# Patient Record
Sex: FEMALE | Race: AMERICAN INDIAN OR ALASKA NATIVE
[De-identification: names, ages, dates, MRNs, and addresses within clinical notes are randomized per-mention and may not be internally consistent; named-entity substitution may affect disease eponyms.]

---

## 2018-05-09 ENCOUNTER — HOSPITAL ENCOUNTER (EMERGENCY)
Dept: HOSPITAL 5 - ED | Age: 53
Discharge: HOME | End: 2018-05-09
Payer: MEDICARE

## 2018-05-09 VITALS — SYSTOLIC BLOOD PRESSURE: 133 MMHG | DIASTOLIC BLOOD PRESSURE: 50 MMHG

## 2018-05-09 DIAGNOSIS — I10: ICD-10-CM

## 2018-05-09 DIAGNOSIS — Z87.891: ICD-10-CM

## 2018-05-09 DIAGNOSIS — L03.115: Primary | ICD-10-CM

## 2018-05-09 DIAGNOSIS — Z88.2: ICD-10-CM

## 2018-05-09 DIAGNOSIS — M32.9: ICD-10-CM

## 2018-05-09 PROCEDURE — 99282 EMERGENCY DEPT VISIT SF MDM: CPT

## 2018-05-09 PROCEDURE — 96372 THER/PROPH/DIAG INJ SC/IM: CPT

## 2018-05-09 NOTE — EMERGENCY DEPARTMENT REPORT
ED Extremity Problem HPI





- General


Chief complaint: Extremity Injury, Lower


Stated complaint: INSECT/ANIMAL BITE


Time Seen by Provider: 05/09/18 12:42


Source: patient


Mode of arrival: Ambulatory


Limitations: No Limitations





- History of Present Illness


Initial comments: 





52-year-old -American female with a past medical history of lupus comes 

in for complaint of right foot with possible insect bite 2 days.  Patient 

reports that her right foot skin has been red and and swollen.  Patient denies 

any drainage or bite marks.  She is walking with crutches.  She reports that 

the pain is constant sharp located on the right foot near the first metacarpal.

  She reports past medical history of hypertension currently on atenolol and 

triamterene.  Lupus she's taken Plaquenil and prednisone.


MD Complaint: extremity pain, extremity swelling


-: days(s) (2)


Location: right, other


History of Same: No (foot)


Severity scale (0 -10): 10


Quality: aching, sharp, constant


Improves with: rest


Worsens with: weight bearing


Associated Symptoms: denies other symptoms





- Related Data


 Previous Rx's











 Medication  Instructions  Recorded  Last Taken  Type


 


Cephalexin [Keflex] 500 mg PO BID #20 capsule 05/09/18 Unknown Rx


 


Ibuprofen [Motrin 800 MG tab] 800 mg PO Q8HR PRN #30 tablet 05/09/18 Unknown Rx











 Allergies











Allergy/AdvReac Type Severity Reaction Status Date / Time


 


Sulfa (Sulfonamide AdvReac  Rash Unverified 03/12/14 11:13





Antibiotics)     














ED Review of Systems


ROS: 


Stated complaint: INSECT/ANIMAL BITE


Other details as noted in HPI





Comment: All other systems reviewed and negative


Musculoskeletal: joint swelling (right foot), arthralgia (right foot)


Skin: change in color (right foot)


Psychiatric: denies: anxiety, depression


Hematological/Lymphatic: denies: easy bleeding, easy bruising





ED Past Medical Hx





- Past Medical History


Previous Medical History?: Yes


Additional medical history: Lupus, Left wrist pain





- Surgical History


Past Surgical History?: Yes


Additional Surgical History: right hip surgery x 2 with Right hip replacement





- Social History


Smoking Status: Former Smoker


Substance Use Type: Prescribed





- Medications


Home Medications: 


 Home Medications











 Medication  Instructions  Recorded  Confirmed  Last Taken  Type


 


Cephalexin [Keflex] 500 mg PO BID #20 capsule 05/09/18  Unknown Rx


 


Ibuprofen [Motrin 800 MG tab] 800 mg PO Q8HR PRN #30 tablet 05/09/18  Unknown Rx














ED Physical Exam





- General


Limitations: No Limitations


General appearance: alert, in no apparent distress





- Head


Head exam: Present: atraumatic, normocephalic





- Expanded Lower Extremity Exam


  ** Right


Hip exam: Present: normal inspection, full ROM


Upper Leg exam: Present: normal inspection, full ROM


Knee exam: Present: normal inspection, full ROM


Lower Leg exam: Present: normal inspection, full ROM


Ankle exam: Present: normal inspection, full ROM


Foot/Toe exam: Present: full ROM, tenderness (first medical carpal), swelling (

first metacarpal), erythema.  Absent: ecchymosis, deformity, crepidus, puncture 

wound, foreign body


Neuro vascular tendon exam: Present: no vascular compromise


Gait: Positive: observed and limited by pain (using crutches)





ED Course





 Vital Signs











  05/09/18





  10:06


 


Temperature 98.3 F


 


Pulse Rate 74


 


Respiratory 20





Rate 


 


Blood Pressure 133/50


 


O2 Sat by Pulse 99





Oximetry 














ED Medical Decision Making





- Medical Decision Making





Patient's been evaluated by this provider fast track.  I discussed the patient 

we will not know if she's been bitten by a snake or any insect.  Discussed the 

patient that her foot appears to have a cellulitis concern that is warm red and 

swollen.  Discussed the patient we'll place her on Keflex for 10 days and 

ibuprofen.  Patient has a sulfur allergy.  Discussed the patient to follow up 

with her primary care provider if symptoms persist or gets worse.  Patient 

verbalized understanding


Critical care attestation.: 


If time is entered above; I have spent that time in minutes in the direct care 

of this critically ill patient, excluding procedure time.








ED Disposition


Clinical Impression: 


 Cellulitis of right foot without toes





Disposition: DC-01 TO HOME OR SELFCARE


Is pt being admited?: No


Does the pt Need Aspirin: No


Condition: Stable


Instructions:  Cellulitis (ED)


Additional Instructions: 


Please complete antibiotics as prescribed.  Ibuprofen as needed for pain and 

swelling.  If symptoms persist or gets worse please follow up with her primary 

care provider.


Prescriptions: 


Cephalexin [Keflex] 500 mg PO BID #20 capsule


Ibuprofen [Motrin 800 MG tab] 800 mg PO Q8HR PRN #30 tablet


 PRN Reason: Pain


Referrals: 


KEILA KASPER MD [Staff Physician] - 3-5 Days

## 2019-09-12 ENCOUNTER — HOSPITAL ENCOUNTER (OUTPATIENT)
Dept: HOSPITAL 5 - MAMMO | Age: 54
Discharge: HOME | End: 2019-09-12
Attending: PEDIATRICS
Payer: MEDICARE

## 2019-09-12 DIAGNOSIS — Z78.0: ICD-10-CM

## 2019-09-12 DIAGNOSIS — M85.88: Primary | ICD-10-CM

## 2019-09-12 PROCEDURE — 77080 DXA BONE DENSITY AXIAL: CPT

## 2019-09-13 NOTE — MAMMOGRAPHY REPORT
BONE DEXA



CLINICAL: Postmenopausal.



COMPARISON: 7/26/2013



TECHNIQUE: 2 site bone DEXA performed on an Hologic scanner.



FINDINGS:



The average BMD of the lumbar spine L1-L4 is 0.936g/cm squared with a T score of -1.0 and a Z score o
f -0.7. This compares to 1.038g/cm squared on the last exam and represents a -9.8 % change from the [
previous baseline].



The average BMD of the left hip is 0.775 g/cm squared with a T score of -1.4and a Z score of -1.1. Th
is compares to 0.794 g/cm squared on the last exam and represents a -2.3 % change from the [previous 
baseline].



IMPRESSION:

1. WHO classification: Normal with average fracture risk based on spine measurements. A moderate decl
ine in spine BMD compared to the last exam.

2. WHO classification Osteopenia with increased fracture risk based on left hip measurements. A modes
t decline in left hip BMD compared to the last exam.

3. The FRAX 10 year fracture probability for a major osteoporotic fracture is 2.9%.

4. The FRAX 10 year fracture probability for hip fracture is 0.2%.



Note:FRAX version 3.01. Fracture probability calculated for an untreated patient. Fracture probabilit
y may be lower if the patient has received treatment.





RECOMMENDATION: Clinical correlation and routine screening.



Definitions:

BMD equal bone mineral density

T score = BMD related to peak bone mass of young adult (Anastasiia expressed an standard deviation)

Z score = age-matched BMD expressed in SD

World health organization (WHO) diagnostic criteria

Normal T score greater than equal to 1 standard deviation

Osteopenia T score between -1 and -2.4 standard deviation

Osteoporosis T score -2.5 standard deviation or below.



Note: BMD is not the only risk factor for fracture; also consider factors such as the patient's age, 
risk of falling, previous osteoporotic fracture, family history of osteoporotic fractures, current sm
oker and low body weight.



Z scores are not calculated if greater than 80 years of age.



Signer Name: Joaquim Kramer MD 

Signed: 9/13/2019 8:27 AM

 Workstation Name: [id number]

## 2021-03-31 ENCOUNTER — HOSPITAL ENCOUNTER (EMERGENCY)
Dept: HOSPITAL 5 - ED | Age: 56
Discharge: HOME | End: 2021-03-31
Payer: MEDICARE

## 2021-03-31 VITALS — SYSTOLIC BLOOD PRESSURE: 112 MMHG | DIASTOLIC BLOOD PRESSURE: 74 MMHG
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
## 2021-03-31 DIAGNOSIS — S90.32XA: ICD-10-CM

## 2021-03-31 DIAGNOSIS — S90.861A: Primary | ICD-10-CM

## 2021-03-31 DIAGNOSIS — Z88.2: ICD-10-CM

## 2021-03-31 DIAGNOSIS — Z98.890: ICD-10-CM

## 2021-03-31 DIAGNOSIS — Z87.891: ICD-10-CM
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
## 2021-03-31 DIAGNOSIS — L03.115: ICD-10-CM
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
## 2021-03-31 DIAGNOSIS — Z79.1: ICD-10-CM

## 2021-03-31 DIAGNOSIS — Z79.2: ICD-10-CM

## 2021-03-31 PROCEDURE — 99283 EMERGENCY DEPT VISIT LOW MDM: CPT

## 2021-03-31 PROCEDURE — 96372 THER/PROPH/DIAG INJ SC/IM: CPT

## 2021-03-31 PROCEDURE — 73630 X-RAY EXAM OF FOOT: CPT

## 2021-03-31 NOTE — EVENT NOTE
ED Screening Note


Date of service: 03/31/21


Time: 13:31


ED Screening Note: 





Pt complains of left foot crush injury with cans 6 days ago  and right foot pain

x3 to 4 weeks after a wall sustaining


History of lupus


Denies being on immunosuppressive medications





This initial assessment/diagnostic orders/clinical plan/treatment(s) is/are 

subject to change based on patients health status, clinical progression and re-

assessment by fellow clinical providers in the ED. Further treatment and workup 

at subsequent clinical providers discretion. Patient/guardian urged not to elope

from the ED as their condition may be serious if not clinically assessed and 

managed. 





Initial orders include: 


X-ray

## 2021-03-31 NOTE — EMERGENCY DEPARTMENT REPORT
ED Lower Extremity HPI





- General


Chief Complaint: Extremity Injury, Lower


Stated Complaint: FOOT PAIN


Time Seen by Provider: 03/31/21 12:56


Source: patient


Mode of arrival: Ambulatory


Limitations: No Limitations





- History of Present Illness


Initial Comments: 





56 YO AA COMES TO ER WITH B FOOT PAIN





RIGHT FOOT- WHILE DRIVING IN EARLY MARCH, SHE STATES SHE GOT BIT BY AN INSECT. 





LEFT FOOT- WHILE PUTTING GROCERIES AWAY SHE DROPPED 2 LARGE CANS OF PEACHES ON 

THEM





PT TOOK NOTHING FOR PAIN PTA





PT DID NOT SEE HER PCP





HX FX L FOOT PER PT











NEUROVASC INTACT ON ARRIVAL TO ACC; XRAY NEG AS ORDERED IN TRIAGE





- Related Data


                                  Previous Rx's











 Medication  Instructions  Recorded  Last Taken  Type


 


Amoxicillin [Trimox CAP] 500 mg PO BID #20 capsule 03/31/21 Unknown Rx


 


Ibuprofen [Motrin] 800 mg PO Q8HR PRN #30 tablet 03/31/21 Unknown Rx











                                    Allergies











Allergy/AdvReac Type Severity Reaction Status Date / Time


 


Sulfa (Sulfonamide AdvReac  Rash Unverified 03/12/14 11:13





Antibiotics)     














ED Review of Systems


ROS: 


Stated complaint: FOOT PAIN


Other details as noted in HPI





Comment: All other systems reviewed and negative





ED Past Medical Hx





- Past Medical History


Previous Medical History?: Yes


Additional medical history: Lupus, Left wrist pain





- Surgical History


Past Surgical History?: Yes


Additional Surgical History: right hip surgery x 2 with Right hip replacement





- Family History


Family history: no significant





- Social History


Smoking Status: Former Smoker


Substance Use Type: Prescribed





- Medications


Home Medications: 


                                Home Medications











 Medication  Instructions  Recorded  Confirmed  Last Taken  Type


 


Amoxicillin [Trimox CAP] 500 mg PO BID #20 capsule 03/31/21  Unknown Rx


 


Ibuprofen [Motrin] 800 mg PO Q8HR PRN #30 tablet 03/31/21  Unknown Rx














ED Physical Exam





- General


Limitations: No Limitations


General appearance: alert, in no apparent distress





- Head


Head exam: Present: atraumatic, normocephalic





- Eye


Eye exam: Present: normal appearance





- ENT


ENT exam: Present: mucous membranes moist





- Neck


Neck exam: Present: normal inspection





- Respiratory


Respiratory exam: Present: normal lung sounds bilaterally.  Absent: respiratory 

distress





- Cardiovascular


Cardiovascular Exam: Present: regular rate, normal rhythm.  Absent: systolic 

murmur, diastolic murmur, rubs, gallop





- GI/Abdominal


GI/Abdominal exam: Present: soft, normal bowel sounds





- Extremities Exam


Extremities exam: Present: normal inspection





- Back Exam


Back exam: Present: normal inspection





- Neurological Exam


Neurological exam: Present: alert, oriented X3





- Psychiatric


Psychiatric exam: Present: normal affect, normal mood





- Skin


Skin exam: Present: warm, dry, intact, other (swelling r foot - mid foot area sp

 bug bite early march; lle wnl).  Absent: rash





ED Course


                                   Vital Signs











  03/31/21





  11:48


 


Temperature 99.5 F


 


Pulse Rate 74


 


Respiratory 16





Rate 


 


Blood Pressure 112/74





[Right] 


 


O2 Sat by Pulse 98





Oximetry 














ED Lower Extremity MDM





- Radiology Data


Radiology results: report reviewed, image reviewed





nap





- Medical Decision Making





xr neg





soft tissue swelling w redness r foot


neurovasc intact


will treat for cellulitis





contusion l foot





crutches for comfort








dc home with dc plan of care and pcp follow up; pt verbalizes understanding. 








                                   Vital Signs











  03/31/21





  11:48


 


Temperature 99.5 F


 


Pulse Rate 74


 


Respiratory 16





Rate 


 


Blood Pressure 112/74





[Right] 


 


O2 Sat by Pulse 98





Oximetry 














- Differential Diagnosis


ro fx


Critical care attestation.: 


If time is entered above; I have spent that time in minutes in the direct care 

of this critically ill patient, excluding procedure time.








ED Disposition


Clinical Impression: 


 Insect bite, Cellulitis, Contusion





Disposition: DC-01 TO HOME OR SELFCARE


Is pt being admited?: No


Does the pt Need Aspirin: No


Condition: Stable


Instructions:  Cellulitis, Adult, Foot Contusion, Easy-to-Read


Additional Instructions: 


ICE 


REST


ELEVATE FEET





MEDS AS ORDERED TODAY





TYLENOL OVER THE COUNTER CAN BE USED FOR PAIN 





CRUTCHES ARE TEMPORARY AND ONLY FOR COMFORT


USE FOR 48 HOURS AND THEN SEE PCP





REFERRAL BELOW FOR PCP


Prescriptions: 


Ibuprofen [Motrin] 800 mg PO Q8HR PRN #30 tablet


 PRN Reason: Pain, Moderate (4-6)


Amoxicillin [Trimox CAP] 500 mg PO BID #20 capsule


Referrals: 


VAUGHN MARIE MD [Staff Physician] - 3-5 Days


Time of Disposition: 14:13

## 2021-03-31 NOTE — XRAY REPORT
Right foot 3 views



INDICATION: Crush injury



FINDINGS: MTP joints and IP joints appear normal. Calcaneal spurring is seen. No displaced fracture. 
Mild swelling of the distal foot.



Left foot 3 views



INDICATION: Injury



FINDINGS: MTP joints and IP joints appear normal. Calcaneal spurring is seen. Midfoot alignment appea
rs normal. Mild swelling in the distal foot



Signer Name: Ke Soler MD 

Signed: 3/31/2021 1:56 PM

Workstation Name: "[email or web address][id number]

## 2021-03-31 NOTE — XRAY REPORT
Right foot 3 views



INDICATION: Crush injury



FINDINGS: MTP joints and IP joints appear normal. Calcaneal spurring is seen. No displaced fracture. 
Mild swelling of the distal foot.



Left foot 3 views



INDICATION: Injury



FINDINGS: MTP joints and IP joints appear normal. Calcaneal spurring is seen. Midfoot alignment appea
rs normal. Mild swelling in the distal foot



Signer Name: Ke Soler MD 

Signed: 3/31/2021 1:56 PM

Workstation Name: [id number]

## 2021-10-25 ENCOUNTER — HOSPITAL ENCOUNTER (OUTPATIENT)
Dept: HOSPITAL 5 - MAMMO | Age: 56
Discharge: HOME | End: 2021-10-25
Attending: PEDIATRICS
Payer: MEDICARE

## 2021-10-25 DIAGNOSIS — Z12.31: Primary | ICD-10-CM

## 2021-10-25 PROCEDURE — 77067 SCR MAMMO BI INCL CAD: CPT

## 2021-10-26 NOTE — MAMMOGRAPHY REPORT
BILATERAL DIGITAL SCREENING MAMMOGRAM WITH CAD 

 

HISTORY: Screening mammogram.



TECHNIQUE:  Routine digital mammographic imaging performed.  This examination was interpreted with petar mitchell benefit of Computer-aided Detection analysis. 



COMPARISON: 11/3/2016, 8/3/2015.



FINDINGS: 



Breast Density: heterogeneously dense breast parenchymal pattern which somewhat lessens the sensitivi
ty of the evaluation.



Digital CC and MLO views demonstrate no mammographic evidence of malignancy.





IMPRESSION:



No mammographic evidence of malignancy.  If the clinical examination remains stable, recommend bilate
ral mammogram in approximately one year.



BIRADS 1:  Negative.





FURTHER INFORMATION:  According to the American College of Radiology, yearly mammograms are recommend
ed starting at age 40 and continuing as long as a woman is in good health. Clinical Breast Exams shou
ld be part of a periodic health exam-about every 3 years for women in their 20s and 30s and every yea
r for women 40 and over. Breast self exam is an option for women starting in their 20s. Any breast ch
tony noted on a breast self exam should be reported promptly to the patient's healthcare provider. Br
east MRI is recommended for women with an approximately 20-25% or greater lifetime risk of breast can
cer, including women with a strong family history of breast or ovarian cancer and women who have been
 treated for Hodgkin's disease.



A negative Mammography report should not discourage follow up or biopsy of a clinically significant f
inding and/or abnormality.



Dense breast tissue may obscure small neoplasms.  



The patient will be entered into a reminder system with a target due date for the next screening mamm
ogram.



Signer Name: Dann Chandler MD 

Signed: 10/26/2021 8:14 AM

Workstation Name: [id number]

## 2022-06-02 ENCOUNTER — HOSPITAL ENCOUNTER (OUTPATIENT)
Dept: HOSPITAL 5 - MAMMO | Age: 57
Discharge: HOME | End: 2022-06-02
Attending: PEDIATRICS
Payer: MEDICARE

## 2022-06-02 DIAGNOSIS — M81.0: Primary | ICD-10-CM

## 2022-06-02 DIAGNOSIS — M32.9: ICD-10-CM

## 2022-06-02 PROCEDURE — 77080 DXA BONE DENSITY AXIAL: CPT

## 2022-06-02 NOTE — MAMMOGRAPHY REPORT
DEXA BONE DENSITY SCAN



INDICATION / CLINICAL INFORMATION: M32.9.

56 years Female



COMPARISON: 9/12/2019



LUMBAR SPINE, L1-L4:

- Bone mineral density (BMD) = 0.970 g/cm2.

- T-score = -0.7 

- Change (%) since most recent prior (if available):  +3.7



LEFT HIP, NECK :

- Bone mineral density (BMD) = 0.645 g/cm2.

- T-score = -1.8 

- Change (%) since most recent prior (if available):  -1.8







IMPRESSION:

1. WHO Classification: Osteopenia. Fracture Risk: Increased. 

2. 10-Year Fracture Risk (FRAX) = Major Osteoporotic 3.5% / Hip: 0.3%





FRAX generally not reported for patients with normal or osteoporotic BMD, in non-steroid-treated ame
ents younger than age 50, or in patients undergoing pharmacotherapy







=================================================================

BMD Reporting Guidelines (ISCD, 2015)

=================================================================

BMD Reporting in Postmenopausal Women and in Men Age 50 and Older

- T-scores are preferred.

- The WHO densitometric classification is applicable.



BMD Reporting in Females Prior to Menopause and in Males Younger Than Age 50

- Z-scores, not T-scores, are preferred. This is particularly important in children.

- A Z-score of -2.0 or lower is defined as below the expected range for age, and a Z-score above -2.0
 is within the expected range for age.

- Osteoporosis cannot be diagnosed in men under age 50 on the basis of BMD alone.

- The WHO diagnostic criteria may be applied to women in the menopausal transition.





http://www.iscd.org/official-positions/1816-otit-wduiofbx-positions-adult/





Signer Name: Arun Rosario MD 

Signed: 6/2/2022 4:03 PM

Workstation Name: [id number]

## 2022-09-19 LAB
BUN SERPL-MCNC: 22 MG/DL (ref 7–17)
BUN/CREAT SERPL: 20 %
CALCIUM SERPL-MCNC: 9.2 MG/DL (ref 8.4–10.2)
HCT VFR BLD CALC: 40 % (ref 30.3–42.9)
HEMOLYSIS INDEX: 2
HGB BLD-MCNC: 13.3 GM/DL (ref 10.1–14.3)
MCHC RBC AUTO-ENTMCNC: 33 % (ref 30–34)
MCV RBC AUTO: 94 FL (ref 79–97)
PLATELET # BLD: 212 K/MM3 (ref 140–440)
RBC # BLD AUTO: 4.28 M/MM3 (ref 3.65–5.03)

## 2022-09-20 NOTE — ANESTHESIA CONSULTATION
Anesthesia Consult and Med Hx


Date of service: 09/21/22





- Airway


Anesthetic Teeth Evaluation: Crowns, Partials


ROM Head & Neck: Adequate


Mental/Hyoid Distance: Adequate


Mallampati Class: Class IV


Intubation Access Assessment: Difficult





- Pre-Operative Health Status


ASA Pre-Surgery Classification: ASA3


Proposed Anesthetic Plan: General





- Pulmonary


Hx Smoking: Yes (Former)


Hx Sleep Apnea: No





- Cardiovascular System


Hx Hypertension: Yes


Hx Heart Murmur: Yes





- Central Nervous System


Hx Psychiatric Problems: No





- Endocrine


Hx Renal Disease: Yes (Cr 1.17)


Hx Liver Disease: Yes (ALT 33)





- Other Systems


Hx Cancer: No


Hx Obesity: Yes





- Additional Comments


Anesthesia Medical History Comments: Pt reports getting covid booster vaccine in

August and LUE now tingles and is swollen. PCP evaluation/clearance on chart.  

Pt has SLE (lupus)

## 2022-09-21 ENCOUNTER — HOSPITAL ENCOUNTER (OUTPATIENT)
Dept: HOSPITAL 5 - OR | Age: 57
Discharge: HOME | End: 2022-09-21
Attending: OBSTETRICS & GYNECOLOGY
Payer: MEDICARE

## 2022-09-21 VITALS — DIASTOLIC BLOOD PRESSURE: 78 MMHG | SYSTOLIC BLOOD PRESSURE: 159 MMHG

## 2022-09-21 DIAGNOSIS — Z96.641: ICD-10-CM

## 2022-09-21 DIAGNOSIS — Z20.822: ICD-10-CM

## 2022-09-21 DIAGNOSIS — Z98.41: ICD-10-CM

## 2022-09-21 DIAGNOSIS — Z88.2: ICD-10-CM

## 2022-09-21 DIAGNOSIS — N84.1: ICD-10-CM

## 2022-09-21 DIAGNOSIS — Z86.2: ICD-10-CM

## 2022-09-21 DIAGNOSIS — N95.0: Primary | ICD-10-CM

## 2022-09-21 DIAGNOSIS — Z79.899: ICD-10-CM

## 2022-09-21 DIAGNOSIS — Z98.42: ICD-10-CM

## 2022-09-21 DIAGNOSIS — E78.00: ICD-10-CM

## 2022-09-21 DIAGNOSIS — I10: ICD-10-CM

## 2022-09-21 DIAGNOSIS — Z87.891: ICD-10-CM

## 2022-09-21 DIAGNOSIS — E66.9: ICD-10-CM

## 2022-09-21 DIAGNOSIS — M10.9: ICD-10-CM

## 2022-09-21 PROCEDURE — 88305 TISSUE EXAM BY PATHOLOGIST: CPT

## 2022-09-21 PROCEDURE — 58558 HYSTEROSCOPY BIOPSY: CPT

## 2022-09-21 PROCEDURE — 80048 BASIC METABOLIC PNL TOTAL CA: CPT

## 2022-09-21 PROCEDURE — 85027 COMPLETE CBC AUTOMATED: CPT

## 2022-09-21 PROCEDURE — 36415 COLL VENOUS BLD VENIPUNCTURE: CPT

## 2022-09-21 PROCEDURE — U0003 INFECTIOUS AGENT DETECTION BY NUCLEIC ACID (DNA OR RNA); SEVERE ACUTE RESPIRATORY SYNDROME CORONAVIRUS 2 (SARS-COV-2) (CORONAVIRUS DISEASE [COVID-19]), AMPLIFIED PROBE TECHNIQUE, MAKING USE OF HIGH THROUGHPUT TECHNOLOGIES AS DESCRIBED BY CMS-2020-01-R: HCPCS

## 2022-09-21 NOTE — OPERATIVE REPORT
Operative Report


Operative Report: 





Date of procedure: September 21, 2022





Pre-operative diagnosis: Cervical polyp; postmenopausal bleeding





Post-operative diagnosis: Same as above





Procedure name(s): Hysteroscopy; cervical polypectomy; dilatation and curettage





Surgeon: Pamela Dial M.D.





Assistant: None





Anesthesia: LMA





Findings mucus cervical polyp; atrophic endometrial lining





Indication: 56-year-old G0 with a history of a cervical polyp and postmenopausal

spotting.





Procedure


The patient was taken to the operating room and given general tracheal 

anesthesia without complication.  The patient was prepped and draped in a normal

sterile fashion.  A bivalve speculum was placed in the patient's vagina single-

tooth tenaculums placed on the anterior lip of the cervix.  It was noted to be a

mucus cervical polyp protruding from the cervical os.  The ring forcep was used 

to remove the polyp.  The cervical os was dilated with graduated dilators.  A 

uterine sound was inserted.  The hysteroscope was then placed.  Insufflation of 

the uterine cavity was performed with normal saline.  Gen. survey of the uterine

cavity revealed atrophic endometrium without evidence of intracavitary lesions. 

The hysteroscope was then removed.  A sharp curettage of the endometrial surface

was performed.  The remainder of the vaginal instruments were then removed 

atraumatically.  The patient was then successfully extubated taken to the 

recovery room.  All sponge laps and needle counts were correct 2.

## 2022-09-21 NOTE — SHORT STAY SUMMARY
Short Stay Documentation


Date of service: 09/21/22


Narrative H&P: 





55y/o G0 with findings of a cervical polyp and postmenopausal spotting. The 

patient has had cervical polyps in the past with removal in office for which the

patient states she did not tolerate well.





- History


Principal diagnosis: Cervical polyp


Past Medical History: arthritis, hypertension, other (Lupus)


Past Surgical History: total hip replacement


Social history: single





- Allergies and Medications


Current Medications: 


                                    Allergies





Sulfa (Sulfonamide Antibiotics) Adverse Reaction (Unverified 03/12/14 11:13)


   Rash





                                Home Medications











 Medication  Instructions  Recorded  Confirmed  Last Taken  Type


 


ALPRAZolam [Xanax TAB] 1 mg PO HS PRN 09/13/22 09/13/22 Unknown History


 


Albuterol Sulfate [Proventil Hfa] 1 puff IH Q4H PRN 09/13/22 09/13/22 Unknown 

History


 


Atenolol [Tenormin] 100 mg PO DAILY 09/13/22 09/13/22 Unknown History


 


Folic Acid [Folvite] 1 mg PO QDAY 09/13/22 09/13/22 Unknown History


 


Hydroxychloroquine [Plaquenil] 200 mg PO QDAY 09/13/22 09/13/22 Unknown History


 


Lovastatin 20 mg PO HS 09/13/22 09/13/22 Unknown History


 


Meclizine [Antivert] 25 mg PO TID PRN 09/13/22 09/13/22 Unknown History


 


Triamterene/Hydrochlorothiazid 1 each PO DAILY 09/13/22 09/13/22 Unknown History





[Triamterene-Hctz 37.5-25 mg Cp]     


 


allopurinoL [Zyloprim] 100 mg PO QDAY 09/13/22 09/13/22 Unknown History


 


metHOTREXate sodium [Methotrexate] 2.5 mg PO QWEEK 09/13/22 09/13/22 Unknown 

History


 


predniSONE [Deltasone] 5 mg PO BID 09/13/22 09/13/22 Unknown History


 


tiZANidine [Zanaflex 4mg TAB] 4 mg PO HS 09/13/22 09/13/22 Unknown History








Active Medications





Acetaminophen (Acetaminophen 500 Mg Tab)  1,000 mg PO ONCE NR


   Stop: 09/21/22 23:59


Lactated Ringer's (Lactated Ringers)  1,000 mls @ 125 mls/hr IV AS DIRECT EDGARDO


Midazolam HCl (Midazolam 2 Mg/2 Ml Inj)  2 mg IV PREOP NR


   Stop: 09/21/22 23:59











- Physical exam


General appearance: no acute distress


Integumentary: no rash


HEENT: Atraumatic


Lungs: Clear to auscultation


Breasts: deferred


Heart: Regular rate


Gastrointestinal: normal


Female Genitourinary: deferred





- Brief post op/procedure progress note


Date of procedure: 09/21/22


Pre-op diagnosis: Cervical polyp


Post-op diagnosis: same


Procedure: 





Hysteroscopy


Removal of cervical polyp


Dilatation curettage


Anesthesia: DIANA


Surgeon: JUNG WINSTON


Estimated blood loss: minimal


Pathology: list (Cervical polyp and endometrial curettings)


Specimen disposition: to lab


Condition: stable





- Hospital course


Hospital course: 





Patient was admitted the day of surgery underwent a hysteroscopy and removal of 

cervical polyp.  Please see operative note for details of surgery.  Her 

postoperative course was uneventful.





- Disposition


Condition at discharge: Good


Disposition: 01 HOME / SELF CARE / HOMELESS





Short Stay Discharge Plan


Activity: other (Pelvic rest for 1 week)


Diet: regular


Additional Instructions: 


Schedule follow-up with Dr. Winston in 2 to 4 weeks


Pelvic rest for 1 week


Prescriptions: 


Ibuprofen [Motrin] 800 mg PO Q8HR PRN #30 tablet


 PRN Reason: Pain , Severe (7-10)


HYDROcodone/APAP 5-325 [[place] 5/325] 1 each PO Q6HR PRN #15 tablet


 PRN Reason: Pain